# Patient Record
Sex: MALE | Race: OTHER | URBAN - METROPOLITAN AREA
[De-identification: names, ages, dates, MRNs, and addresses within clinical notes are randomized per-mention and may not be internally consistent; named-entity substitution may affect disease eponyms.]

---

## 2022-02-17 ENCOUNTER — EMERGENCY (EMERGENCY)
Facility: HOSPITAL | Age: 37
LOS: 0 days | Discharge: HOME | End: 2022-02-18
Attending: EMERGENCY MEDICINE | Admitting: EMERGENCY MEDICINE
Payer: SELF-PAY

## 2022-02-17 VITALS
WEIGHT: 240.08 LBS | HEIGHT: 70 IN | HEART RATE: 115 BPM | SYSTOLIC BLOOD PRESSURE: 217 MMHG | TEMPERATURE: 99 F | RESPIRATION RATE: 20 BRPM | OXYGEN SATURATION: 95 % | DIASTOLIC BLOOD PRESSURE: 121 MMHG

## 2022-02-17 DIAGNOSIS — S43.004A UNSPECIFIED DISLOCATION OF RIGHT SHOULDER JOINT, INITIAL ENCOUNTER: ICD-10-CM
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
## 2022-02-17 DIAGNOSIS — R20.2 PARESTHESIA OF SKIN: ICD-10-CM

## 2022-02-17 DIAGNOSIS — Z92.83 PERSONAL HISTORY OF FAILED MODERATE SEDATION: ICD-10-CM

## 2022-02-17 DIAGNOSIS — R20.0 ANESTHESIA OF SKIN: ICD-10-CM

## 2022-02-17 DIAGNOSIS — Z96.611 PRESENCE OF RIGHT ARTIFICIAL SHOULDER JOINT: ICD-10-CM
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
## 2022-02-17 DIAGNOSIS — F43.10 POST-TRAUMATIC STRESS DISORDER, UNSPECIFIED: ICD-10-CM

## 2022-02-17 PROCEDURE — 99291 CRITICAL CARE FIRST HOUR: CPT | Mod: 25

## 2022-02-17 PROCEDURE — 99053 MED SERV 10PM-8AM 24 HR FAC: CPT

## 2022-02-17 PROCEDURE — 99152 MOD SED SAME PHYS/QHP 5/>YRS: CPT

## 2022-02-17 PROCEDURE — 73030 X-RAY EXAM OF SHOULDER: CPT | Mod: 26,RT

## 2022-02-17 RX ORDER — MORPHINE SULFATE 50 MG/1
8 CAPSULE, EXTENDED RELEASE ORAL ONCE
Refills: 0 | Status: DISCONTINUED | OUTPATIENT
Start: 2022-02-17 | End: 2022-02-17

## 2022-02-17 RX ADMIN — MORPHINE SULFATE 8 MILLIGRAM(S): 50 CAPSULE, EXTENDED RELEASE ORAL at 23:27

## 2022-02-18 VITALS — SYSTOLIC BLOOD PRESSURE: 165 MMHG | DIASTOLIC BLOOD PRESSURE: 108 MMHG | HEART RATE: 128 BPM

## 2022-02-18 PROCEDURE — 73030 X-RAY EXAM OF SHOULDER: CPT | Mod: 26,RT

## 2022-02-18 PROCEDURE — 73020 X-RAY EXAM OF SHOULDER: CPT | Mod: 26,RT,59

## 2022-02-18 RX ORDER — MORPHINE SULFATE 50 MG/1
2 CAPSULE, EXTENDED RELEASE ORAL ONCE
Refills: 0 | Status: DISCONTINUED | OUTPATIENT
Start: 2022-02-18 | End: 2022-02-18

## 2022-02-18 RX ORDER — KETOROLAC TROMETHAMINE 30 MG/ML
30 SYRINGE (ML) INJECTION ONCE
Refills: 0 | Status: DISCONTINUED | OUTPATIENT
Start: 2022-02-18 | End: 2022-02-18

## 2022-02-18 RX ORDER — MORPHINE SULFATE 50 MG/1
4 CAPSULE, EXTENDED RELEASE ORAL ONCE
Refills: 0 | Status: DISCONTINUED | OUTPATIENT
Start: 2022-02-18 | End: 2022-02-18

## 2022-02-18 RX ORDER — HYDROMORPHONE HYDROCHLORIDE 2 MG/ML
1 INJECTION INTRAMUSCULAR; INTRAVENOUS; SUBCUTANEOUS ONCE
Refills: 0 | Status: DISCONTINUED | OUTPATIENT
Start: 2022-02-18 | End: 2022-02-18

## 2022-02-18 RX ORDER — KETAMINE HYDROCHLORIDE 100 MG/ML
70 INJECTION INTRAMUSCULAR; INTRAVENOUS ONCE
Refills: 0 | Status: DISCONTINUED | OUTPATIENT
Start: 2022-02-18 | End: 2022-02-18

## 2022-02-18 RX ORDER — DIAZEPAM 5 MG
10 TABLET ORAL ONCE
Refills: 0 | Status: DISCONTINUED | OUTPATIENT
Start: 2022-02-18 | End: 2022-02-18

## 2022-02-18 RX ADMIN — Medication 30 MILLIGRAM(S): at 02:34

## 2022-02-18 RX ADMIN — MORPHINE SULFATE 2 MILLIGRAM(S): 50 CAPSULE, EXTENDED RELEASE ORAL at 00:29

## 2022-02-18 RX ADMIN — HYDROMORPHONE HYDROCHLORIDE 1 MILLIGRAM(S): 2 INJECTION INTRAMUSCULAR; INTRAVENOUS; SUBCUTANEOUS at 02:34

## 2022-02-18 RX ADMIN — Medication 2 MILLIGRAM(S): at 00:26

## 2022-02-18 RX ADMIN — Medication 10 MILLIGRAM(S): at 00:56

## 2022-02-18 RX ADMIN — MORPHINE SULFATE 4 MILLIGRAM(S): 50 CAPSULE, EXTENDED RELEASE ORAL at 00:25

## 2022-02-18 RX ADMIN — HYDROMORPHONE HYDROCHLORIDE 1 MILLIGRAM(S): 2 INJECTION INTRAMUSCULAR; INTRAVENOUS; SUBCUTANEOUS at 00:56

## 2022-02-18 RX ADMIN — KETAMINE HYDROCHLORIDE 70 MILLIGRAM(S): 100 INJECTION INTRAMUSCULAR; INTRAVENOUS at 02:50

## 2022-02-18 NOTE — ED PROCEDURE NOTE - PROCEDURE NAME, MLM
Point of Care Ultrasound Vascular Access
Point of Care Ultrasound Vascular Access
Joint Reduction with Procedural Sedation

## 2022-02-18 NOTE — ED PROVIDER NOTE - CLINICAL SUMMARY MEDICAL DECISION MAKING FREE TEXT BOX
Pt presented with dislocation of his right shoulder prothesis. Pain control difficult to achieve due to pt limiting meds he is willing to take. Refused conscious sedation at first and required large doses of benzos and opioids. First attempt unsuccessful and pt agreed to conscious sedation using ketamine. Reduction successful and shoulder bound. Pt's post procedure neuro exam intact. Pt ambulatory, normal mental status and tolerating po. Will dc with outpt f/up.

## 2022-02-18 NOTE — ED PROVIDER NOTE - OBJECTIVE STATEMENT
36M presents to ED for     33-year-old male presents to the ED status post right shoulder dislocation.  Patient reports symptoms started when he was putting on a jacket.  History of repeated right shoulder dislocations.  Patient on arrival was noted to have a right shoulder dislocation with distal pulses and sensation intact.  X-ray revealed a possible anterior dislocation.  X-ray also revealed right shoulder hardware.  Due to prior surgery patient requested no medications for sedation and or intra-articular block.  Ortho consulted at bedside.  Patient was given 8 mg of morphine IM.  Left upper arm IV placed.  Case signed out to Dr. Solomon, pending reduction and final disposition. 36M PMHx PTSD presents to ED for R shoulder dislocation. 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 Pt reports his shoulder dislocated when he tried to put his jacket on today, has hx of repeated R shoulder dislocations. Endorses numbness and tingling in RUE from shoulder to tips of fingers. Denies falls/trauma. Pt lives in [place] and is visiting [place], follows with orthopedic surgeon in [place].

## 2022-02-18 NOTE — ED PROVIDER NOTE - CARE PLAN
Principal Discharge DX:	Shoulder dislocation   1 Principal Discharge DX:	Shoulder dislocation  Secondary Diagnosis:	History of conscious sedation

## 2022-02-18 NOTE — ED PROVIDER NOTE - CARE PROVIDER_API CALL
Vince Woods)  [facility]ly [facility]  [address] [place] [address] [place] [address]  Phone: [phone]  Fax: [phone]  Follow Up Time: 1-3 Days

## 2022-02-18 NOTE — ED PROVIDER NOTE - PHYSICAL EXAMINATION
VITAL SIGNS: I have reviewed nursing notes and confirm.  CONSTITUTIONAL: agitated, yelling in pain, walking around room clutching R shoulder  SKIN: Warm dry, normal skin turgor  HEAD: NCAT  ENT: Moist mucous membranes  CARD: tachycardic, S1/S2  EXT: +R shoulder dislocation with squared off appearance. Pt refused further physical exam of shoulder from resident.  NEURO: awake, alert, normal gait.

## 2022-02-18 NOTE — CONSULT NOTE ADULT - SUBJECTIVE AND OBJECTIVE BOX
Orthopaedics Consult Note    BASIL MORALES  [phone]      Patient is a 36y year old Male  with PMH significant for PTSD s/p 9 various shoulder surgeries including rTSA performed in [facility] in 2017. Since patient's most recent surgery (2017), he has dislocated the right shoulder several times. Patient is from [place] where he received his orthopedic care but states he is here visiting. Was attempting to put his jacket on tonight when his shoulder dislocated. Patient is refusing any attempt to obtain an axillary view or CT of the shoulder (due to his PTSD his feet must be planted on the ground). Also refusing conscious sedation due PTSD. Endorses numbness and tingling in all digits up to the elbow.    PMH/PSH  ^DISLOCATED SHOULDLER    MEWS Score    DISLOCATED SHOULDLER    SysAdmin_VisitLink        Medications      Allergies  No Known Allergies        T(C): 37.4 (02-17-22 @ 22:46), Max: 37.4 (02-17-22 @ 22:46)  HR: 115 (02-17-22 @ 22:46) (115 - 115)  BP: 217/121 (02-17-22 @ 22:46) (217/121 - 217/121)  RR: 20 (02-17-22 @ 22:46) (20 - 20)  SpO2: 95% (02-17-22 @ 22:46) (95% - 95%)    Physical Exam  NAD  Breathing comfortably on RA  Resting comfortably    RUE  Skin intact  + swelling of shoulder to digits  Motor: unable to assess AIN/PIN/Ulnar, limited by pain  Sensory: M/R/U diminished, Ax intact  Vasc: hand WWP, 2+ radial pulse      Labs                Img  XR shoulder: dislocated shoulder hemiarthroplasty, no fractures        Procedure  ***    A/P: Patient is a 36y year old Male with ***    ***WB on ***  *** care instructions provided  Return to clinic: Orthopaedic *** with  ***, please call [phone] to schedule an appointment for ***  Return to ED with uncontrolled pain/bleeding/fever/chills/numbness/tingling/cool extremity/inability to move extremity    Admit to ***  Diet: ***  ***WB on ***  DVT ppx: SCD, ***  Abx: ***  Pain control  Home medications: ***  Repeat labs in AM  PT consult           Orthopaedics Consult Note    BASIL MORALES  [phone]      Patient is a 36y year old Male  with PMH significant for PTSD s/p 9 various shoulder surgeries including right shoulder hemiarthroplasty performed in [facility] in 2017. Since patient's most recent surgery (2017), he has dislocated the right shoulder several times. Patient is from [place] where he receives his orthopedic care but states he is here visiting. Was attempting to put his jacket on tonight when his shoulder dislocated. Patient is refusing any attempt to obtain an axillary view or CT of the shoulder (due to his PTSD his feet must be planted on the ground). Also refusing conscious sedation due PTSD. Endorses numbness and tingling in all digits up to the elbow.     Update note: patient agreed to conscious sedation with ketamine only after failed attempt at reduction with morphine, dilaudid, and ativan. Attempt to remove patients rings while under conscious sedation was unsuccessful and patient would not allow ring removal with ring cutter    PMH/PSH  ^DISLOCATED SHOULDLER    MEWS Score    DISLOCATED SHOULDLER    SysAdmin_VisitLink        Medications      Allergies  No Known Allergies        T(C): 37.4 (02-17-22 @ 22:46), Max: 37.4 (02-17-22 @ 22:46)  HR: 115 (02-17-22 @ 22:46) (115 - 115)  BP: 217/121 (02-17-22 @ 22:46) (217/121 - 217/121)  RR: 20 (02-17-22 @ 22:46) (20 - 20)  SpO2: 95% (02-17-22 @ 22:46) (95% - 95%)    Physical Exam  NAD  Breathing comfortably on RA  Resting comfortably    RUE  Skin intact  + swelling of shoulder to digits  Motor: unable to assess AIN/PIN/Ulnar, limited by pain  Sensory: M/R/U diminished, Ax intact  Vasc: hand WWP, 2+ radial pulse      Labs        Img  XR shoulder: dislocated shoulder hemiarthroplasty, no fractures        Procedure  Right shoulder closed reduction with ketamine. Audible and palpable clunk felt, shoulder dislocated again with abduction past 20 degrees, re-reduced and placed in sling and swath. Reduction seen on AP. Unable to obtain axillary due re-dislocation and patient continued to refuse CT    A/P: Patient is a 36y year old Male with right shoulder hemiarthroplasty s/p closed reduction under conscious sedation, placed in sling and swath. Patient observed in ED for return of function and sensation, which returned on post-reduction examination.    FREDDY CHI  Instructed patient he must stay in sling/swath until he can be seen in clinic or his own orthopedist back home, whichever may be sooner  Patient understands that due to his refusal of CT, definitive confirmation of reduction cannot be concluded  Pain control as needed  Return to clinic: Orthopaedic Sports with Dr. Woods, please call [phone] to schedule an appointment or follow up with his orthopedist in [place]   Return to ED with uncontrolled pain/bleeding/fever/chills/numbness/tingling/cool extremity/inability to move extremity

## 2022-02-18 NOTE — ED PROVIDER NOTE - PROGRESS NOTE DETAILS
AN: Sign out received from Dr. Harris  Pt evaluated by me. Presented with right shoulder dislocation while putting on his jacket. Xray consistent with ant shoulder dislocation. Plan is reduction. Pt refusing various meds, including intraarticular lidocaine. Only accepting opioid and benzo, no ketamine. Pain difficult to control. Ortho at bedside. Plan is pain control and joint reduction. Dieter: Pt refusing meds and then changing mind, has been verbally aggressive to ED staff. Pt initially refused procedural sedation.  Pt received multiple doses of morphine, ativan, dilaudid, and valium. Reduction attempted with ortho resident Dr. Cabezas, was unsuccessful.  Pt now wants to try procedural sedation. Will attempt with ketamine. Ortho aware. Dieter: Dieter: Pt verbally agreed to conscious sedation, refused to sign written paperwork due to dominant arm being dislocated. Agreed to procedural sedation for reduction, witnessed by Dr. Guerra, Dr. Ocampo, RN Jarrett, ortho resident Dr. Manjarrez. Shoulder reduced under sedation, XR shows reduction.  post reduction exam completed by ortho resident with ED team at bedside. pt with FROM of R hand, nerve exam intact. pt states he feels ready for discharge. Ambulated and tolerated PO. AN: Pt s/p shoulder reduction is walking around the ED room. Mental status intact and having normal conversations. Drank several cups of OJ. Ortho resident Dr. Tapia discussed f/up with pt in 4 days with Dr. Woods. Pt to be dced. His wife will come and pick him up.

## 2022-02-18 NOTE — ED PROVIDER NOTE - NSFOLLOWUPINSTRUCTIONS_ED_ALL_ED_FT
FOLLOW UP WITH YOUR ORTHOPEDIC DOCTOR.  KEEP YOUR SHOULDER IN THE SLING AND BINDER.    SHOULDER DISLOCATION - AfterCare(R) Instructions(ER/ED)     Shoulder Dislocation    WHAT YOU NEED TO KNOW:    A shoulder dislocation occurs when the top of your arm bone (humerus) moves out of the socket in your shoulder blade.Shoulder Anatomy         DISCHARGE INSTRUCTIONS:    Return to the emergency department if:     Your injured shoulder and arm become pale or cold.      You cannot move your injured shoulder and arm.      You have increased redness or swelling in your injured shoulder.      Your shoulder becomes dislocated again.    Contact your healthcare provider if:     You have a fever.      You have increased pain in your injured shoulder and arm even after you rest and take your medicine.      You have new weakness or numbness in your injured shoulder and arm.      You have questions or concerns about your condition or care.    Medicines:You may need any of the following:     Prescription pain medicine may be given. Ask your healthcare provider how to take this medicine safely. Some prescription pain medicines contain acetaminophen. Do not take other medicines that contain acetaminophen without talking to your healthcare provider. Too much acetaminophen may cause liver damage. Prescription pain medicine may cause constipation. Ask your healthcare provider how to prevent or treat constipation.       A muscle relaxer may help the tight muscles in your shoulder relax.      Take your medicine as directed. Contact your healthcare provider if you think your medicine is not helping or if you have side effects. Tell him or her if you are allergic to any medicine. Keep a list of the medicines, vitamins, and herbs you take. Include the amounts, and when and why you take them. Bring the list or the pill bottles to follow-up visits. Carry your medicine list with you in case of an emergency.    Rest your injured shoulder and arm: Rest helps your muscles and tissues heal. Avoid activities that cause pain or use an overhead arm motion. Your healthcare provider will tell you when it is safe to return to sports or other daily activities.    How to wear a brace, sling, or splint: A brace, sling, or splint may be needed to limit your movement and protect your injured shoulder.Shoulder Sling         Wear your brace, sling, or splint all the time. Take it off only to bathe or do exercises as directed. Ask your healthcare provider how many weeks you should wear it.      Keep your skin clean and dry. Place padding under your armpit to help absorb sweat and prevent sores on your skin.      Do not hunch your shoulders. This may cause pain. Keep your shoulders relaxed.      Support your wrist and hand with the sling. Cover the knuckles on your hand with your sling. Your wrist should be positioned higher than your elbow. Your wrist may start to hurt or go numb if your sling is too short.    Apply ice: Ice helps decrease swelling and pain. Ice may also help prevent tissue damage. Use an ice pack, or put crushed ice in a plastic bag. Cover it with a towel before you place it on your shoulder. Apply ice for 15 to 20 minutes every hour or as directed.    Exercises: Begin gentle exercises as directed. After healing begins, you may start light exercises so your shoulder does not get stiff. Go to physical therapy if directed. A physical therapist teaches you exercises to help improve movement and strength, and to decrease pain. Do not lift heavy objects or do any exercise that causes severe pain.    Follow up with your healthcare provider in 5 to 10 days: Write down your questions so you remember to ask them during your visits.            Procedural Sedation    During your Emergency Department visit, you were given medication to help relax you and alleviate pain to aid in a diagnostic or therapeutic procedure. The medication given is typically short-acting but may have some lingering effects for up to 48 hours. Do not be alone - have a responsible adult stay with you until you are awake and alert. Do not drive or operate heavy machinery for the next 24 hours. Do not drink alcohol or smoke or take medicines that cause drowsiness. Do not make important decisions or sign legal documents or take care of children on your own.    SEEK IMMEDIATE MEDICAL CARE IF YOU HAVE ANY OF THE FOLLOWING SYMPTOMS: trouble breathing, confusion, inability to urinate, severe pain, rash, lightheadedness/dizziness, or fainting.

## 2022-02-18 NOTE — ED PROVIDER NOTE - WET READ LAUNCH FT
There is 1 Wet Read(s) to document. There are 2 Wet Read(s) to document. There are 3 Wet Read(s) to document. There are no Wet Read(s) to document.

## 2022-02-18 NOTE — ED PROCEDURE NOTE - CPROC ED INFORMED CONSENT1
pt refused to sign papers due to his dislocation, verbal consent given with witness by Dr. Guerra, Dr. Ocampo, orthopedic resident, ED RN/Benefits, risks, and possible complications of procedure explained to patient/caregiver who verbalized understanding and gave verbal consent.

## 2022-02-18 NOTE — ED PROVIDER NOTE - ATTENDING CONTRIBUTION TO CARE
33-year-old male presents to the ED status post right shoulder dislocation.  Patient reports symptoms started when he was putting on a jacket.  History of repeated right shoulder dislocations.  Patient on arrival was noted to have a right shoulder dislocation with distal pulses and sensation intact.  X-ray revealed a possible anterior dislocation.  X-ray also revealed right shoulder hardware.  Due to prior surgery patient requested no medications for sedation and or intra-articular block.  Ortho consulted at bedside.  Patient was given 8 mg of morphine IM.  Left upper arm IV placed.  Case signed out to Dr. Solomon, pending reduction and final disposition.

## 2022-02-18 NOTE — ED PROVIDER NOTE - PATIENT PORTAL LINK FT
You can access the FollowMyHealth Patient Portal offered by [facility] by registering at the following website: [email or web address]. By joining [employer]’s FollowMyHealth portal, you will also be able to view your health information using other applications (apps) compatible with our system.

## 2022-03-03 NOTE — ED PROCEDURE NOTE - ATTENDING CONTRIBUTION TO CARE
I personally evaluated the patient. I reviewed the Resident’s or Physician Assistant’s note (as assigned above), and agree with the findings and plan except as documented in my note.    I personally supervised the study.  I reviewed the images and interpretation by the resident/ACP and have edited where appropriate.    Patient with difficult IV access I intend to get an US guided IV
I was present for and supervised the key/critical aspects of the procedures performed during the care of the patient.
I personally supervised the study.  I reviewed the images and interpretation by the resident/ACP and have edited where appropriate.